# Patient Record
Sex: FEMALE | Race: WHITE
[De-identification: names, ages, dates, MRNs, and addresses within clinical notes are randomized per-mention and may not be internally consistent; named-entity substitution may affect disease eponyms.]

---
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
## 2023-08-07 PROBLEM — Z00.00 ENCOUNTER FOR PREVENTIVE HEALTH EXAMINATION: Status: ACTIVE | Noted: 2023-08-07

## 2023-08-08 ENCOUNTER — APPOINTMENT (OUTPATIENT)
Dept: COLORECTAL SURGERY | Facility: CLINIC | Age: 23
End: 2023-08-08
Payer: MEDICAID

## 2023-08-08 VITALS
RESPIRATION RATE: 16 BRPM | HEIGHT: 64 IN | SYSTOLIC BLOOD PRESSURE: 120 MMHG | BODY MASS INDEX: 22.53 KG/M2 | DIASTOLIC BLOOD PRESSURE: 80 MMHG | WEIGHT: 132 LBS | HEART RATE: 86 BPM | TEMPERATURE: 98 F | OXYGEN SATURATION: 98 %

## 2023-08-08 DIAGNOSIS — K64.4 RESIDUAL HEMORRHOIDAL SKIN TAGS: ICD-10-CM

## 2023-08-08 DIAGNOSIS — Z82.49 FAMILY HISTORY OF ISCHEMIC HEART DISEASE AND OTHER DISEASES OF THE CIRCULATORY SYSTEM: ICD-10-CM

## 2023-08-08 DIAGNOSIS — L29.0 PRURITUS ANI: ICD-10-CM

## 2023-08-08 DIAGNOSIS — K64.8 OTHER HEMORRHOIDS: ICD-10-CM

## 2023-08-08 DIAGNOSIS — K62.89 OTHER SPECIFIED DISEASES OF ANUS AND RECTUM: ICD-10-CM

## 2023-08-08 DIAGNOSIS — Z82.0 FAMILY HISTORY OF EPILEPSY AND OTHER DISEASES OF THE NERVOUS SYSTEM: ICD-10-CM

## 2023-08-08 PROCEDURE — 99203 OFFICE O/P NEW LOW 30 MIN: CPT

## 2023-08-08 NOTE — HISTORY OF PRESENT ILLNESS
[FreeTextEntry1] : 23-year-old female presenting with a reported 10-month history of anorectal hemorrhoid engorgement with associated discomfort, itching, and hygiene challenges.  The patient reports a history of IBS with both a diarrhea and constipation in consistency.  She reports her hemorrhoids are particularly symptoms when she is at one of the extremes of her bowel pattern.  She denies a family history of first-degree relatives with colorectal cancer.  She has not herself undergone previous endoscopic evaluation.  She reports being under the care of a gastroenterologist some years ago however is not currently active with a gastroenterologist managing her GI functional issues.

## 2023-08-08 NOTE — PHYSICAL EXAM
[FreeTextEntry1] : Anorectal examination includes visual, digital rectal exam, anoscopic exam with the MA present as a witness.  Anal margin anal verge and external hemorrhoids are visibly normal. Digital rectal exam is nontender without palpable mass.  Anoscopic exam reveals grade 1 internal hemorrhoids with some mild excoriation noted over the right posterior quadrant.

## 2023-08-08 NOTE — ASSESSMENT
[FreeTextEntry1] : 22-year-old female with a 10-month history of symptomatic anorectal hemorrhoid issues.  At this time there is no urgency requiring surgical intervention.  I discussed with Mary the normal anatomy in this area.  She reports that she is more curious about what is going on rather than suffering from symptoms in this area.  I discussed with her a watch and wait recommendation with ongoing dietary efforts to manage her IBS.  She should follow-up with her primary care physician on these issues, with appropriate gastroenterology consultation if needed.  Also discussed with the patient the potential benefits of an office-based sclerosis of internal hemorrhoid program.  I provided the patient risks and benefits.  She wishes to think about this potential course of management.  She is also interested in how it might be processed through her health insurance.  Appropriate diagnosis codes and procedure ICD 10 codes provided to patient for research.  She is indicated she will consider the information provided today and return if her symptoms should worsen or she wishes to move forward with a nonsurgical office-based internal hemorrhoids sclerosis program.